# Patient Record
Sex: FEMALE | URBAN - METROPOLITAN AREA
[De-identification: names, ages, dates, MRNs, and addresses within clinical notes are randomized per-mention and may not be internally consistent; named-entity substitution may affect disease eponyms.]

---

## 2024-05-23 ENCOUNTER — OUTPATIENT (OUTPATIENT)
Dept: OUTPATIENT SERVICES | Facility: HOSPITAL | Age: 34
LOS: 1 days | End: 2024-05-23
Payer: COMMERCIAL

## 2024-05-23 VITALS — OXYGEN SATURATION: 96 % | HEART RATE: 83 BPM

## 2024-05-23 DIAGNOSIS — O26.899 OTHER SPECIFIED PREGNANCY RELATED CONDITIONS, UNSPECIFIED TRIMESTER: ICD-10-CM

## 2024-05-23 PROCEDURE — 59025 FETAL NON-STRESS TEST: CPT | Mod: 26

## 2024-05-23 PROCEDURE — 99221 1ST HOSP IP/OBS SF/LOW 40: CPT | Mod: 25

## 2024-05-23 PROCEDURE — G0463: CPT

## 2024-05-23 PROCEDURE — 59025 FETAL NON-STRESS TEST: CPT

## 2024-05-23 NOTE — OB PROVIDER TRIAGE NOTE - NSHPPHYSICALEXAM_GEN_ALL_CORE
Vital Signs Last 24 Hrs  T(C): 36.8 (23 May 2024 20:14), Max: 36.8 (23 May 2024 20:14)  T(F): 98.24 (23 May 2024 20:14), Max: 98.24 (23 May 2024 20:14)  HR: 85 (23 May 2024 20:14) (57 - 85)  BP: 116/73 (23 May 2024 20:14) (116/73 - 124/71)  RR: 18 (23 May 2024 20:14) (16 - 18)  SpO2: 94% (23 May 2024 20:09) (93% - 98%)    Gen: alert, NAD  Abd: gravid, soft, non-tender  Ext: wwp, no LE edema or pain bilaterally    SVE: 1/50/-3    EFM: baseline 130, mod variability, +accels, no decels  [place]: occ contractions   BSUS: vertex, BPP 8/8, fetal movement seen by pt, BECKY 30cm   EFW: 3500

## 2024-05-23 NOTE — OB PROVIDER TRIAGE NOTE - NSPRIMARYCAREPROV_OBGYN_ALL_OB
Please tell her at this point I would not take any medication just adopt a diet but we talked about. MD//VIET/JAYLA

## 2024-05-23 NOTE — OB PROVIDER TRIAGE NOTE - NSOBPROVIDERNOTE_OBGYN_ALL_OB_FT
32yo  @ 39w1d JUAN  presenting to triage c/o decreased fetal movement, now feeling normal movement. Fetal status reassuring with reactive tracing and 8 BPP. Noted to have polyhydramnios with BECKY of 30cm. Long discussion held with patient and partner regarding diagnosis and recommendation for IOL given current gestational age. Pt and partner in agreement with plan. Pt will go eat and return for IOL. Pt will return in 2-3 hours.    - pt to return in 2-3h for IOL  - plan for PO cytotec, GBS negative, maternal vitals stable   - labor precautions discussed with pt       Discussed with Dr. Amos

## 2024-05-23 NOTE — OB RN TRIAGE NOTE - MENTAL HEALTH CONDITIONS/SYMPTOMS, PROFILE
Pt with sharp abdominal pain starting this morning. Pain is intermittent. Some nausea present. No fever, vomiting, or diarrhea noted.
anxiety disorder

## 2024-05-23 NOTE — OB RN TRIAGE NOTE - FALL HARM RISK - UNIVERSAL INTERVENTIONS
Bed in lowest position, wheels locked, appropriate side rails in place/Call bell, personal items and telephone in reach/Instruct patient to call for assistance before getting out of bed or chair/Non-slip footwear when patient is out of bed/[place] to call system/Physically safe environment - no spills, clutter or unnecessary equipment/Purposeful Proactive Rounding/Room/bathroom lighting operational, light cord in reach

## 2024-05-23 NOTE — OB PROVIDER TRIAGE NOTE - HISTORY OF PRESENT ILLNESS
OB Triage Note    34yo  @ 39w1d, JUAN  presents to triage c/o decreased fetal movement for a few hours today with no fetal movement felt despite changing position and drinking cold water. Since presenting to triage she is feeling normal fetal movement. Denies contractions, LOF or VB.      PNC: Premier  AP Issues: none  PNL: GBS negative     OBHx:   GynHx: h/o ovarian cysts, denies abnormal Paps, STIs, fibroids   PMH: denies  PSH: denies  Meds: PNV  Allergies: NKDA  Social: denies alcohol/tobacco/drug use in pregnancy   Psych: denies anxiety/depression     Will accept blood transfusions: Yes OB Triage Note    32yo  @ 39w1d, JUAN  presents to triage c/o decreased fetal movement for a few hours today with no fetal movement felt despite changing position and drinking cold water. Since presenting to triage she is feeling normal fetal movement. Denies contractions, LOF or VB.      PNC: Premier  AP Issues: none  PNL: GBS negative     OBHx:   chemical pregnancy   GynHx: h/o ovarian cysts. Denies abnormal Paps, STIs, fibroids   PMH: denies  PSH: denies  Meds: PNV  Allergies: NKDA  Social: denies alcohol/tobacco/drug use in pregnancy   Psych: h/o anxiety, no meds    Will accept blood transfusions: Yes

## 2024-05-23 NOTE — OB RN TRIAGE NOTE - CHIEF COMPLAINT QUOTE
Sore throat and stomach ache started last night. Fever at home. Brother had strep throat last week. "I wasn't feeling baby move today, but I am feeling movement now"

## 2024-05-24 ENCOUNTER — INPATIENT (INPATIENT)
Facility: HOSPITAL | Age: 34
LOS: 2 days | Discharge: HOME | DRG: 951 | End: 2024-05-27
Attending: OBSTETRICS & GYNECOLOGY | Admitting: OBSTETRICS & GYNECOLOGY
Payer: COMMERCIAL

## 2024-05-24 VITALS — DIASTOLIC BLOOD PRESSURE: 74 MMHG | RESPIRATION RATE: 18 BRPM | SYSTOLIC BLOOD PRESSURE: 120 MMHG | TEMPERATURE: 98 F

## 2024-05-24 VITALS — HEART RATE: 63 BPM | OXYGEN SATURATION: 98 %

## 2024-05-24 DIAGNOSIS — Z3A.39 39 WEEKS GESTATION OF PREGNANCY: ICD-10-CM

## 2024-05-24 DIAGNOSIS — O26.899 OTHER SPECIFIED PREGNANCY RELATED CONDITIONS, UNSPECIFIED TRIMESTER: ICD-10-CM

## 2024-05-24 DIAGNOSIS — O36.8130 DECREASED FETAL MOVEMENTS, THIRD TRIMESTER, NOT APPLICABLE OR UNSPECIFIED: ICD-10-CM

## 2024-05-24 DIAGNOSIS — Z34.80 ENCOUNTER FOR SUPERVISION OF OTHER NORMAL PREGNANCY, UNSPECIFIED TRIMESTER: ICD-10-CM

## 2024-05-24 LAB
BASOPHILS # BLD AUTO: 0.02 K/UL — SIGNIFICANT CHANGE UP (ref 0–0.2)
BASOPHILS NFR BLD AUTO: 0.2 % — SIGNIFICANT CHANGE UP (ref 0–2)
BLD GP AB SCN SERPL QL: NEGATIVE — SIGNIFICANT CHANGE UP
EOSINOPHIL # BLD AUTO: 0.09 K/UL — SIGNIFICANT CHANGE UP (ref 0–0.5)
EOSINOPHIL NFR BLD AUTO: 1 % — SIGNIFICANT CHANGE UP (ref 0–6)
HCT VFR BLD CALC: 33.8 % — LOW (ref 34.5–45)
HGB BLD-MCNC: 11.4 G/DL — LOW (ref 11.5–15.5)
IMM GRANULOCYTES NFR BLD AUTO: 0.5 % — SIGNIFICANT CHANGE UP (ref 0–0.9)
LYMPHOCYTES # BLD AUTO: 1.64 K/UL — SIGNIFICANT CHANGE UP (ref 1–3.3)
LYMPHOCYTES # BLD AUTO: 18.5 % — SIGNIFICANT CHANGE UP (ref 13–44)
MCHC RBC-ENTMCNC: 29.9 PG — SIGNIFICANT CHANGE UP (ref 27–34)
MCHC RBC-ENTMCNC: 33.7 GM/DL — SIGNIFICANT CHANGE UP (ref 32–36)
MCV RBC AUTO: 88.7 FL — SIGNIFICANT CHANGE UP (ref 80–100)
MONOCYTES # BLD AUTO: 1.22 K/UL — HIGH (ref 0–0.9)
MONOCYTES NFR BLD AUTO: 13.8 % — SIGNIFICANT CHANGE UP (ref 2–14)
NEUTROPHILS # BLD AUTO: 5.85 K/UL — SIGNIFICANT CHANGE UP (ref 1.8–7.4)
NEUTROPHILS NFR BLD AUTO: 66 % — SIGNIFICANT CHANGE UP (ref 43–77)
NRBC # BLD: 0 /100 WBCS — SIGNIFICANT CHANGE UP (ref 0–0)
PLATELET # BLD AUTO: 202 K/UL — SIGNIFICANT CHANGE UP (ref 150–400)
RBC # BLD: 3.81 M/UL — SIGNIFICANT CHANGE UP (ref 3.8–5.2)
RBC # FLD: 14 % — SIGNIFICANT CHANGE UP (ref 10.3–14.5)
RH IG SCN BLD-IMP: POSITIVE — SIGNIFICANT CHANGE UP
T PALLIDUM AB TITR SER: NEGATIVE — SIGNIFICANT CHANGE UP
WBC # BLD: 8.86 K/UL — SIGNIFICANT CHANGE UP (ref 3.8–10.5)
WBC # FLD AUTO: 8.86 K/UL — SIGNIFICANT CHANGE UP (ref 3.8–10.5)

## 2024-05-24 RX ORDER — CITRIC ACID/SODIUM CITRATE 300-500 MG
15 SOLUTION, ORAL ORAL EVERY 6 HOURS
Refills: 0 | Status: DISCONTINUED | OUTPATIENT
Start: 2024-05-24 | End: 2024-05-25

## 2024-05-24 RX ORDER — OXYTOCIN 10 UNIT/ML
333.33 VIAL (ML) INJECTION
Qty: 20 | Refills: 0 | Status: DISCONTINUED | OUTPATIENT
Start: 2024-05-24 | End: 2024-05-27

## 2024-05-24 RX ORDER — SODIUM CHLORIDE 9 MG/ML
1000 INJECTION, SOLUTION INTRAVENOUS
Refills: 0 | Status: DISCONTINUED | OUTPATIENT
Start: 2024-05-24 | End: 2024-05-25

## 2024-05-24 RX ORDER — CHLORHEXIDINE GLUCONATE 213 G/1000ML
1 SOLUTION TOPICAL DAILY
Refills: 0 | Status: DISCONTINUED | OUTPATIENT
Start: 2024-05-24 | End: 2024-05-25

## 2024-05-24 RX ADMIN — SODIUM CHLORIDE 125 MILLILITER(S): 9 INJECTION, SOLUTION INTRAVENOUS at 19:08

## 2024-05-24 NOTE — OB PROVIDER H&P - NSHPPHYSICALEXAM_GEN_ALL_CORE
Vital Signs Last 24 Hrs  T(C): 36.8 (23 May 2024 20:14), Max: 36.8 (23 May 2024 20:14)  T(F): 98.24 (23 May 2024 20:14), Max: 98.24 (23 May 2024 20:14)  HR: 75 (24 May 2024 01:25) (57 - 85)  BP: 120/74 (24 May 2024 01:14) (116/73 - 124/71)  RR: 18 (23 May 2024 20:14) (16 - 18)  SpO2: 97% (24 May 2024 01:25) (93% - 99%)    Gen: alert, NAD  Abd: gravid, soft, non-tender  Ext: wwp, no LE edema or pain bilaterally    SVE: 1/50/-3 from triage visit    EFM: baseline *, mod variability, +accels, no decels  [place]: ***  BSUS:   EFW: Vital Signs Last 24 Hrs  T(C): 36.8 (23 May 2024 20:14), Max: 36.8 (23 May 2024 20:14)  T(F): 98.24 (23 May 2024 20:14), Max: 98.24 (23 May 2024 20:14)  HR: 75 (24 May 2024 01:25) (57 - 85)  BP: 120/74 (24 May 2024 01:14) (116/73 - 124/71)  RR: 18 (23 May 2024 20:14) (16 - 18)  SpO2: 97% (24 May 2024 01:25) (93% - 99%)    Gen: alert, NAD  Abd: gravid, soft, non-tender  Ext: wwp, no LE edema or pain bilaterally    SVE: 1/50/-3 from triage visit    EFM: baseline 125, mod variability, +accels, no decels  [place]: occ contractions   BSUS: vertex

## 2024-05-24 NOTE — OB PROVIDER H&P - HISTORY OF PRESENT ILLNESS
OB Admission H&P    yo G*P* @  *w*d LMP ***, JUAN *** presents for ***.      PNC: Premier  AP Issues: Polyhydramnios as per above  PNL: GBS negative     OBHx:   GynHx: denies abnormal Paps, STIs, cysts, fibroids   PMH: denies  PSH: denies  Meds: PNV  Allergies:  Social: denies alcohol/tobacco/drug use in pregnancy   Psych: denies anxiety/depression     Will accept blood transfusions: Yes OB Admission H&P    32yo  @ 39w2d, JUAN  presents for IOL for newly diagnosed polyhydramnios. Pt was seen earlier in triage for decreased FM, reassuring fetal status but with polyhydramnios, BECKY 30cm. Denies contractions, VB, LOF. +FM.       PNC: Premier  AP Issues: Polyhydramnios as per above  PNL: GBS negative     OBHx:   chemical pregnancy   GynHx: h/o ovarian cysts. Denies abnormal Paps, STIs, fibroids   PMH: denies  PSH: denies  Meds: PNV  Allergies: NKDA  Social: denies alcohol/tobacco/drug use in pregnancy   Psych: h/o anxiety, no meds    Will accept blood transfusions: Yes

## 2024-05-24 NOTE — OB PROVIDER H&P - ASSESSMENT
yo G*P* @  *w*d JUAN presting with ***, found to be ***. Fetal status reassuring with Cat I tracing. GBS ***, SVE ***. Maternal vitals stable. Will admit for ***    - Admit to L&D  - Admission labs: CBC, RPR, T&S, Covid Ab/PCR  -Clears  -cEFM/[place]  -***for GBS prophylaxis   -***induction/augmentation/expectant management   - Elevated PPH risk 2/2 ***  - Anesthesia consult prn     Discussed with  *** 32yo  @ 39w2d JUAN  here for IOL for newly diagnosed polyhydramnios, BECKY 30cm. VE /-3, GBS negative, fetal status reassuring with Cat I tracing.  Maternal vitals stable.     - Admit to L&D  - Admission labs: CBC, RPR, T&S  - Clears, mIVF  - cEFM/[place]  - for oral cytotec induction  - Elevated PPH risk 2/2 polyhydramnios   - Anesthesia consult prn for epidural placement     Discussed with Dr. Amos 32yo  @ 39w2d JUAN  here for IOL for newly diagnosed polyhydramnios, BECKY 30cm. VE /-3, GBS negative, fetal status reassuring with Cat I tracing.  Maternal vitals stable.     - Admit to L&D  - Admission labs: CBC, RPR, T&S  - Clears, mIVF  - cEFM/[place]  - for oral cytotec induction  - Elevated PPH risk 2/2 polyhydramnios   - Anesthesia consult prn for epidural placement     Discussed with Dr. Amos    pt seen and eval by me   induction started for dec  newly diagnosed polyhydramnios   pt getting cytotec   fhrt reassureing   cont current management

## 2024-05-24 NOTE — OB RN PATIENT PROFILE - FALL HARM RISK - UNIVERSAL INTERVENTIONS
Bed in lowest position, wheels locked, appropriate side rails in place/Call bell, personal items and telephone in reach/Instruct patient to call for assistance before getting out of bed or chair/Non-slip footwear when patient is out of bed/[place] to call system/Physically safe environment - no spills, clutter or unnecessary equipment/Purposeful Proactive Rounding/Room/bathroom lighting operational, light cord in reach

## 2024-05-25 RX ORDER — HYDROCORTISONE 1 %
1 OINTMENT (GRAM) TOPICAL EVERY 6 HOURS
Refills: 0 | Status: DISCONTINUED | OUTPATIENT
Start: 2024-05-25 | End: 2024-05-27

## 2024-05-25 RX ORDER — IBUPROFEN 200 MG
600 TABLET ORAL EVERY 6 HOURS
Refills: 0 | Status: DISCONTINUED | OUTPATIENT
Start: 2024-05-25 | End: 2024-05-27

## 2024-05-25 RX ORDER — MAGNESIUM HYDROXIDE 400 MG/1
30 TABLET, CHEWABLE ORAL
Refills: 0 | Status: DISCONTINUED | OUTPATIENT
Start: 2024-05-25 | End: 2024-05-27

## 2024-05-25 RX ORDER — OXYTOCIN 10 UNIT/ML
4 VIAL (ML) INJECTION
Qty: 30 | Refills: 0 | Status: DISCONTINUED | OUTPATIENT
Start: 2024-05-25 | End: 2024-05-25

## 2024-05-25 RX ORDER — ACETAMINOPHEN 500 MG
975 TABLET ORAL
Refills: 0 | Status: DISCONTINUED | OUTPATIENT
Start: 2024-05-25 | End: 2024-05-27

## 2024-05-25 RX ORDER — LANOLIN
1 OINTMENT (GRAM) TOPICAL EVERY 6 HOURS
Refills: 0 | Status: DISCONTINUED | OUTPATIENT
Start: 2024-05-25 | End: 2024-05-27

## 2024-05-25 RX ORDER — OXYCODONE HYDROCHLORIDE 5 MG/1
5 TABLET ORAL ONCE
Refills: 0 | Status: DISCONTINUED | OUTPATIENT
Start: 2024-05-25 | End: 2024-05-27

## 2024-05-25 RX ORDER — AER TRAVELER 0.5 G/1
1 SOLUTION RECTAL; TOPICAL EVERY 4 HOURS
Refills: 0 | Status: DISCONTINUED | OUTPATIENT
Start: 2024-05-25 | End: 2024-05-27

## 2024-05-25 RX ORDER — OXYCODONE HYDROCHLORIDE 5 MG/1
5 TABLET ORAL
Refills: 0 | Status: DISCONTINUED | OUTPATIENT
Start: 2024-05-25 | End: 2024-05-27

## 2024-05-25 RX ORDER — IBUPROFEN 200 MG
600 TABLET ORAL EVERY 6 HOURS
Refills: 0 | Status: COMPLETED | OUTPATIENT
Start: 2024-05-25 | End: 2025-04-23

## 2024-05-25 RX ORDER — SODIUM CHLORIDE 9 MG/ML
3 INJECTION INTRAMUSCULAR; INTRAVENOUS; SUBCUTANEOUS EVERY 8 HOURS
Refills: 0 | Status: DISCONTINUED | OUTPATIENT
Start: 2024-05-25 | End: 2024-05-27

## 2024-05-25 RX ORDER — PRAMOXINE HYDROCHLORIDE 150 MG/15G
1 AEROSOL, FOAM RECTAL EVERY 4 HOURS
Refills: 0 | Status: DISCONTINUED | OUTPATIENT
Start: 2024-05-25 | End: 2024-05-27

## 2024-05-25 RX ORDER — OXYTOCIN 10 UNIT/ML
41.67 VIAL (ML) INJECTION
Qty: 20 | Refills: 0 | Status: DISCONTINUED | OUTPATIENT
Start: 2024-05-25 | End: 2024-05-27

## 2024-05-25 RX ORDER — DIPHENHYDRAMINE HCL 50 MG
25 CAPSULE ORAL EVERY 6 HOURS
Refills: 0 | Status: DISCONTINUED | OUTPATIENT
Start: 2024-05-25 | End: 2024-05-27

## 2024-05-25 RX ORDER — SIMETHICONE 80 MG/1
80 TABLET, CHEWABLE ORAL EVERY 4 HOURS
Refills: 0 | Status: DISCONTINUED | OUTPATIENT
Start: 2024-05-25 | End: 2024-05-27

## 2024-05-25 RX ORDER — DIBUCAINE 1 %
1 OINTMENT (GRAM) RECTAL EVERY 6 HOURS
Refills: 0 | Status: DISCONTINUED | OUTPATIENT
Start: 2024-05-25 | End: 2024-05-27

## 2024-05-25 RX ORDER — KETOROLAC TROMETHAMINE 30 MG/ML
30 SYRINGE (ML) INJECTION ONCE
Refills: 0 | Status: DISCONTINUED | OUTPATIENT
Start: 2024-05-25 | End: 2024-05-25

## 2024-05-25 RX ORDER — TETANUS TOXOID, REDUCED DIPHTHERIA TOXOID AND ACELLULAR PERTUSSIS VACCINE, ADSORBED 5; 2.5; 8; 8; 2.5 [IU]/.5ML; [IU]/.5ML; UG/.5ML; UG/.5ML; UG/.5ML
0.5 SUSPENSION INTRAMUSCULAR ONCE
Refills: 0 | Status: DISCONTINUED | OUTPATIENT
Start: 2024-05-25 | End: 2024-05-27

## 2024-05-25 RX ORDER — SODIUM CHLORIDE 9 MG/ML
1000 INJECTION, SOLUTION INTRAVENOUS ONCE
Refills: 0 | Status: COMPLETED | OUTPATIENT
Start: 2024-05-25 | End: 2024-05-25

## 2024-05-25 RX ORDER — BENZOCAINE 10 %
1 GEL (GRAM) MUCOUS MEMBRANE EVERY 6 HOURS
Refills: 0 | Status: DISCONTINUED | OUTPATIENT
Start: 2024-05-25 | End: 2024-05-27

## 2024-05-25 RX ADMIN — Medication 4 MILLIUNIT(S)/MIN: at 09:40

## 2024-05-25 RX ADMIN — SODIUM CHLORIDE 125 MILLILITER(S): 9 INJECTION, SOLUTION INTRAVENOUS at 11:19

## 2024-05-25 RX ADMIN — SODIUM CHLORIDE 1000 MILLILITER(S): 9 INJECTION, SOLUTION INTRAVENOUS at 11:55

## 2024-05-25 RX ADMIN — Medication 30 MILLIGRAM(S): at 19:07

## 2024-05-25 RX ADMIN — SODIUM CHLORIDE 125 MILLILITER(S): 9 INJECTION, SOLUTION INTRAVENOUS at 02:53

## 2024-05-25 RX ADMIN — SODIUM CHLORIDE 125 MILLILITER(S): 9 INJECTION, SOLUTION INTRAVENOUS at 16:00

## 2024-05-25 NOTE — OB PROVIDER DELIVERY SUMMARY - NSPROVIDERDELIVERYNOTE_OBGYN_ALL_OB_FT
Pt fully dilated and pushed    of viable female over midline episiotomy without extension   nuchal cord x 1 clamped and cut on perineum   no vaginal, cervical lacerations   placenta delivered intact   episiotomy repaired with 2-0 chromic with good hemostasis and restoration of anatomy   Pt and infant helen procedure well

## 2024-05-25 NOTE — PRE-ANESTHESIA EVALUATION ADULT - NSANTHOSAYNRD_GEN_A_CORE
No. MALU screening performed.  STOP BANG Legend: 0-2 = LOW Risk; 3-4 = INTERMEDIATE Risk; 5-8 = HIGH Risk
No. MALU screening performed.  STOP BANG Legend: 0-2 = LOW Risk; 3-4 = INTERMEDIATE Risk; 5-8 = HIGH Risk

## 2024-05-25 NOTE — OB RN DELIVERY SUMMARY - NSSELHIDDEN_OBGYN_ALL_OB_FT
[NS_DeliveryAttending1_OBGYN_ALL_OB_FT:[id number]],[NS_DeliveryRN_OBGYN_ALL_OB_FT:[id number]=]

## 2024-05-25 NOTE — PRE-ANESTHESIA EVALUATION ADULT - NSANTHTOBACCOSD_GEN_ALL_CORE
SW/CM Discharge Plan  Informed patient is ready for discharge. Patient’s disposition is Home or Self Care. Patient to be transported by wife.  Patient/interested person has been counseled for post hospitalization care.  Patient agrees and understands goals and plan. Initial implementation of the patient’s discharge plan has been arranged, including any devices/equipment needed for discharge. Discharge plan communicated to MD and RN.      
No
No

## 2024-05-25 NOTE — OB PROVIDER DELIVERY SUMMARY - NSLOWPPHRISK_OBGYN_A_OB
No previous uterine incision/[place] Pregnancy/Less than or equal to 4 previous vaginal births/No known bleeding disorder/No history of postpartum hemorrhage

## 2024-05-25 NOTE — OB PROVIDER LABOR PROGRESS NOTE - NS_OBIHIFHRDETAILS_OBGYN_ALL_OB_FT
baseline 130, mod variability, +accels, no decels
category 1, baseline 140, moderate variability, + accels, - decels

## 2024-05-25 NOTE — OB RN DELIVERY SUMMARY - NS_SEPSISRSKCALC_OBGYN_ALL_OB_FT
EOS calculated successfully. EOS Risk Factor: 0.5/1000 live births ([facility] national incidence); GA=39w3d; Temp=99.7; ROM=6.85; GBS='Negative'; Antibiotics='No antibiotics or any antibiotics < 2 hrs prior to birth'

## 2024-05-25 NOTE — OB PROVIDER LABOR PROGRESS NOTE - ASSESSMENT
- Patient with good cervical change, descent of fetal head  - Fetal head ROT and asynclitic  - Patient placed on left lateral with peanut ball to encourage rotation of fetal head  - Patient with epidural in place, comfortable    d/w Dr. Oppenheim, who is en route  AME Montero, PGY-1
IOL for poly (BECKY 30)    -Labor: currently on buccal Cytotec; will transition to pitocin if pt continues to have ctx too frequently for cytotec  -Fetus: cat 2 with intermittent variable decels, mod variability, overall reassuring  -GBS: neg  -Pain: pt without intervention for pain at this time    D/w Dr. Oppenheim VTimmel PGY2
fhrt with one decel with ctx and recovery to baseline, no other decels   overall reassuring  AROM clear   3-4/60/-2   irreg ctx   cont current management   M.Oppenheim,MD   
33y G1 at 39w2 IOL for Poly (BECKY 30).  Patient seen at bedside for 4.5 minute deceleration which improved with repositioning. Patient examined in the setting of her decelerations    - Continue EFM/ TOCO  - Continue PO cytotec       Plan per Dr. Oppenheimer Isabella Hermantin, PGY1 
P0 @ 39w2d admitted for IOL for poly, has been receiving PO cytotec, VE 3-4/50/-2, Cat I tracing. Maternal vitals stable.   - cont present management  - will start buccal cytotec   - cEFM/toco

## 2024-05-25 NOTE — OB PROVIDER LABOR PROGRESS NOTE - NS_SUBJECTIVE/OBJECTIVE_OBGYN_ALL_OB_FT
Patient seen at bedside for prolonged deceleration 4-5 minutes
Pt seen and examined at bedside.    Vital Signs Last 24 Hrs  T(C): 36.7 (25 May 2024 05:50), Max: 37.2 (24 May 2024 17:35)  T(F): 98.06 (25 May 2024 05:50), Max: 98.96 (24 May 2024 17:35)  HR: 59 (25 May 2024 07:29) (44 - 98)  BP: 112/62 (25 May 2024 07:11) (101/54 - 118/63)  BP(mean): --  RR: 16 (24 May 2024 17:35) (16 - 16)  SpO2: 97% (25 May 2024 07:29) (88% - 100%)
Patient seen and examined due to increased pressure.
Pt with rectal pressure. Has been receiving PO cytotec.    Vital Signs Last 24 Hrs  T(C): 36.7 (24 May 2024 19:10), Max: 37.2 (24 May 2024 17:35)  T(F): 98.06 (24 May 2024 19:10), Max: 98.96 (24 May 2024 17:35)  HR: 59 (24 May 2024 22:40) (48 - 98)  BP: 109/63 (24 May 2024 19:10) (99/58 - 120/74)  RR: 16 (24 May 2024 17:35) (16 - 18)  SpO2: 98% (24 May 2024 22:40) (88% - 100%)

## 2024-05-26 RX ORDER — FAMOTIDINE 10 MG/ML
20 INJECTION INTRAVENOUS ONCE
Refills: 0 | Status: COMPLETED | OUTPATIENT
Start: 2024-05-26 | End: 2024-05-26

## 2024-05-26 RX ORDER — FAMOTIDINE 10 MG/ML
20 INJECTION INTRAVENOUS DAILY
Refills: 0 | Status: DISCONTINUED | OUTPATIENT
Start: 2024-05-26 | End: 2024-05-27

## 2024-05-26 RX ADMIN — Medication 975 MILLIGRAM(S): at 02:36

## 2024-05-26 RX ADMIN — Medication 975 MILLIGRAM(S): at 21:24

## 2024-05-26 RX ADMIN — Medication 600 MILLIGRAM(S): at 12:20

## 2024-05-26 RX ADMIN — Medication 975 MILLIGRAM(S): at 14:16

## 2024-05-26 RX ADMIN — Medication 1 TABLET(S): at 11:22

## 2024-05-26 RX ADMIN — Medication 975 MILLIGRAM(S): at 15:15

## 2024-05-26 RX ADMIN — FAMOTIDINE 20 MILLIGRAM(S): 10 INJECTION INTRAVENOUS at 01:04

## 2024-05-26 RX ADMIN — Medication 975 MILLIGRAM(S): at 09:35

## 2024-05-26 RX ADMIN — Medication 600 MILLIGRAM(S): at 00:28

## 2024-05-26 RX ADMIN — Medication 600 MILLIGRAM(S): at 17:33

## 2024-05-26 RX ADMIN — Medication 975 MILLIGRAM(S): at 22:00

## 2024-05-26 RX ADMIN — Medication 600 MILLIGRAM(S): at 06:06

## 2024-05-26 RX ADMIN — Medication 975 MILLIGRAM(S): at 08:35

## 2024-05-26 RX ADMIN — Medication 600 MILLIGRAM(S): at 18:30

## 2024-05-26 RX ADMIN — FAMOTIDINE 20 MILLIGRAM(S): 10 INJECTION INTRAVENOUS at 08:35

## 2024-05-26 RX ADMIN — Medication 600 MILLIGRAM(S): at 11:23

## 2024-05-26 NOTE — PROGRESS NOTE ADULT - ASSESSMENT
Patient is PPD#1 from [facility] meeting all postpartum milestones. Vital signs are stable and physical exam is unremarkable. Epigastric discomfort improved with Pepcid, continue daily.   - Continue with po analgesia  - Increase ambulation  - Continue regular diet  - HR as low as high 40s/early 50s; upon chart review noted to be same baseline during labor course. Patient is asymptomatic.    AME Montero, PGY-1 Patient is PPD#1 from  meeting all postpartum milestones. Vital signs are stable and physical exam is unremarkable. Epigastric discomfort improved with Pepcid, continue daily.   - Continue with po analgesia  - Increase ambulation  - Continue regular diet  - HR as low as high 40s/early 50s; upon chart review noted to be same baseline during labor course. Patient is asymptomatic.    AME Montero, PGY-1    pt seen and eval by me   doing well   cont current care   M. Oppenheim, MD  Patient is PPD#1 from [facility] meeting all postpartum milestones. Vital signs are stable and physical exam is unremarkable. Epigastric discomfort improved with Pepcid, continue daily.   - Continue with po analgesia  - Increase ambulation  - Continue regular diet  - HR as low as high 40s/early 50s; upon chart review noted to be same baseline during labor course. Patient is asymptomatic.    AME Montero, PGY-1      Pt co pain with deep breath - no CP, no sob, no cough, no palpitations   lungs cta bl   likely musc-skel pain but will get cxr to eval   if clear then will dc home  M. Oppenheim, MD

## 2024-05-26 NOTE — PROGRESS NOTE ADULT - SUBJECTIVE AND OBJECTIVE BOX
Patient seen and examined at bedside. Overnight, patient given Pepcid due to epigastric discomfort. Patient reports discomfort has improved with the Pepcid. No acute complaints, pain well controlled. Patient is ambulating, voiding spontaneously, passing gas, and tolerating regular diet. Denies CP, SOB, N/V, HA, blurred vision, epigastric pain.    Vital Signs Last 24 Hours  T(C): 36.4 (05-26-24 @ 05:57), Max: 37.6 (05-25-24 @ 18:10)  HR: 50 (05-26-24 @ 05:57) (44 - 105)  BP: 118/75 (05-26-24 @ 05:57) (91/63 - 144/62)  RR: 18 (05-26-24 @ 05:57) (16 - 18)  SpO2: 98% (05-26-24 @ 05:57) (79% - 100%)    Physical Exam:  General: NAD  Abdomen: Soft, non-tender, non-distended, fundus firm  Pelvic: Lochia wnl    Labs:    Blood Type: A Positive  Antibody Screen: Negative  RPR: Negative               11.4   8.86  )-----------( 202      ( 05-24 @ 02:45 )             33.8         MEDICATIONS  (STANDING):  acetaminophen     Tablet .. 975 milliGRAM(s) Oral <User Schedule>  diphtheria/tetanus/pertussis (acellular) Vaccine (Adacel) 0.5 milliLiter(s) IntraMuscular once  ibuprofen  Tablet. 600 milliGRAM(s) Oral every 6 hours  oxytocin Infusion 41.667 milliUNIT(s)/Min (125 mL/Hr) IV Continuous <Continuous>  oxytocin Infusion 333.333 milliUNIT(s)/Min (1000 mL/Hr) IV Continuous <Continuous>  prenatal multivitamin 1 Tablet(s) Oral daily  sodium chloride 0.9% lock flush 3 milliLiter(s) IV Push every 8 hours    MEDICATIONS  (PRN):  benzocaine 20%/menthol 0.5% Spray 1 Spray(s) Topical every 6 hours PRN for Perineal discomfort  dibucaine 1% Ointment 1 Application(s) Topical every 6 hours PRN Perineal discomfort  diphenhydrAMINE 25 milliGRAM(s) Oral every 6 hours PRN Pruritus  hydrocortisone 1% Cream 1 Application(s) Topical every 6 hours PRN Moderate Pain (4-6)  lanolin Ointment 1 Application(s) Topical every 6 hours PRN nipple soreness  magnesium hydroxide Suspension 30 milliLiter(s) Oral two times a day PRN Constipation  oxyCODONE    IR 5 milliGRAM(s) Oral every 3 hours PRN Moderate to Severe Pain (4-10)  oxyCODONE    IR 5 milliGRAM(s) Oral once PRN Moderate to Severe Pain (4-10)  pramoxine 1%/zinc 5% Cream 1 Application(s) Topical every 4 hours PRN Moderate Pain (4-6)  simethicone 80 milliGRAM(s) Chew every 4 hours PRN Gas  witch hazel Pads 1 Application(s) Topical every 4 hours PRN Perineal discomfort

## 2024-05-27 VITALS
SYSTOLIC BLOOD PRESSURE: 118 MMHG | DIASTOLIC BLOOD PRESSURE: 81 MMHG | HEART RATE: 49 BPM | OXYGEN SATURATION: 97 % | TEMPERATURE: 98 F | RESPIRATION RATE: 18 BRPM

## 2024-05-27 PROCEDURE — 86850 RBC ANTIBODY SCREEN: CPT

## 2024-05-27 PROCEDURE — 86900 BLOOD TYPING SEROLOGIC ABO: CPT

## 2024-05-27 PROCEDURE — 71046 X-RAY EXAM CHEST 2 VIEWS: CPT

## 2024-05-27 PROCEDURE — 59050 FETAL MONITOR W/REPORT: CPT

## 2024-05-27 PROCEDURE — 86901 BLOOD TYPING SEROLOGIC RH(D): CPT

## 2024-05-27 PROCEDURE — 86780 TREPONEMA PALLIDUM: CPT

## 2024-05-27 PROCEDURE — 85025 COMPLETE CBC W/AUTO DIFF WBC: CPT

## 2024-05-27 PROCEDURE — 71046 X-RAY EXAM CHEST 2 VIEWS: CPT | Mod: 26

## 2024-05-27 RX ORDER — IBUPROFEN 200 MG
1 TABLET ORAL
Qty: 0 | Refills: 0 | DISCHARGE
Start: 2024-05-27

## 2024-05-27 RX ORDER — ACETAMINOPHEN 500 MG
3 TABLET ORAL
Qty: 0 | Refills: 0 | DISCHARGE
Start: 2024-05-27

## 2024-05-27 RX ADMIN — Medication 1 TABLET(S): at 11:56

## 2024-05-27 RX ADMIN — Medication 600 MILLIGRAM(S): at 01:10

## 2024-05-27 RX ADMIN — Medication 600 MILLIGRAM(S): at 00:39

## 2024-05-27 RX ADMIN — Medication 600 MILLIGRAM(S): at 05:03

## 2024-05-27 RX ADMIN — Medication 600 MILLIGRAM(S): at 12:32

## 2024-05-27 RX ADMIN — Medication 975 MILLIGRAM(S): at 08:53

## 2024-05-27 RX ADMIN — Medication 600 MILLIGRAM(S): at 11:56

## 2024-05-27 RX ADMIN — FAMOTIDINE 20 MILLIGRAM(S): 10 INJECTION INTRAVENOUS at 08:52

## 2024-05-27 RX ADMIN — Medication 975 MILLIGRAM(S): at 15:28

## 2024-05-27 RX ADMIN — Medication 975 MILLIGRAM(S): at 16:00

## 2024-05-27 RX ADMIN — Medication 975 MILLIGRAM(S): at 09:30

## 2024-05-27 NOTE — DISCHARGE NOTE OB - HOSPITAL COURSE
see hospital record for detailed account   pt ambulating, helen regular diet and ready for dc home

## 2024-05-27 NOTE — DISCHARGE NOTE OB - PATIENT PORTAL LINK FT
You can access the FollowMyHealth Patient Portal offered by [facility] by registering at the following website: [email or web address]. By joining [employer]’s FollowMyHealth portal, you will also be able to view your health information using other applications (apps) compatible with our system.

## 2024-05-27 NOTE — DISCHARGE NOTE OB - MATERIALS PROVIDED
[facility] [place] Screening Program/Breastfeeding Log/Breastfeeding Mother’s Support Group Information/Guide to Postpartum Care/[facility] Hearing Screen Program/Back To Sleep Handout/Shaken Baby Prevention Handout/Breastfeeding Guide and Packet/Birth Certificate Instructions/Discharge Medication Information for Patients and Families Pocket Guide

## 2024-05-27 NOTE — DISCHARGE NOTE OB - CARE PROVIDER_API CALL
Oppenheim, Melissa H  Obstetrics and Gynecology  [address] [facility] [address], [address] [place] [id number]  Phone: [phone]  Fax: [phone]  Follow Up Time:

## 2024-11-20 PROBLEM — Z00.00 ENCOUNTER FOR PREVENTIVE HEALTH EXAMINATION: Status: ACTIVE | Noted: 2024-11-20

## 2024-11-21 ENCOUNTER — APPOINTMENT (OUTPATIENT)
Dept: ORTHOPEDIC SURGERY | Facility: CLINIC | Age: 34
End: 2024-11-21
Payer: COMMERCIAL

## 2024-11-21 VITALS — WEIGHT: 135 LBS | HEIGHT: 62 IN | BODY MASS INDEX: 24.84 KG/M2

## 2024-11-21 DIAGNOSIS — G56.80 OTHER SPECIFIED MONONEUROPATHIES OF UNSPECIFIED UPPER LIMB: ICD-10-CM

## 2024-11-21 DIAGNOSIS — E78.00 PURE HYPERCHOLESTEROLEMIA, UNSPECIFIED: ICD-10-CM

## 2024-11-21 DIAGNOSIS — M25.512 PAIN IN LEFT SHOULDER: ICD-10-CM

## 2024-11-21 DIAGNOSIS — Z78.9 OTHER SPECIFIED HEALTH STATUS: ICD-10-CM

## 2024-11-21 PROCEDURE — 99203 OFFICE O/P NEW LOW 30 MIN: CPT

## 2024-12-17 ENCOUNTER — APPOINTMENT (OUTPATIENT)
Dept: MRI IMAGING | Facility: CLINIC | Age: 34
End: 2024-12-17

## 2024-12-17 PROCEDURE — 73221 MRI JOINT UPR EXTREM W/O DYE: CPT | Mod: LT

## 2025-01-06 ENCOUNTER — APPOINTMENT (OUTPATIENT)
Dept: ORTHOPEDIC SURGERY | Facility: CLINIC | Age: 35
End: 2025-01-06

## 2025-01-13 ENCOUNTER — APPOINTMENT (OUTPATIENT)
Dept: NEUROLOGY | Facility: CLINIC | Age: 35
End: 2025-01-13

## 2025-01-16 ENCOUNTER — APPOINTMENT (OUTPATIENT)
Dept: NEUROLOGY | Facility: CLINIC | Age: 35
End: 2025-01-16
Payer: COMMERCIAL

## 2025-01-16 PROCEDURE — 95886 MUSC TEST DONE W/N TEST COMP: CPT

## 2025-01-16 PROCEDURE — 95910 NRV CNDJ TEST 7-8 STUDIES: CPT

## 2025-01-20 ENCOUNTER — APPOINTMENT (OUTPATIENT)
Dept: ORTHOPEDIC SURGERY | Facility: CLINIC | Age: 35
End: 2025-01-20

## 2025-02-13 ENCOUNTER — APPOINTMENT (OUTPATIENT)
Dept: ORTHOPEDIC SURGERY | Facility: CLINIC | Age: 35
End: 2025-02-13
Payer: COMMERCIAL

## 2025-02-13 DIAGNOSIS — G56.80 OTHER SPECIFIED MONONEUROPATHIES OF UNSPECIFIED UPPER LIMB: ICD-10-CM

## 2025-02-13 DIAGNOSIS — M25.512 PAIN IN LEFT SHOULDER: ICD-10-CM

## 2025-02-13 PROCEDURE — 99214 OFFICE O/P EST MOD 30 MIN: CPT

## 2025-03-10 ENCOUNTER — APPOINTMENT (OUTPATIENT)
Dept: ORTHOPEDIC SURGERY | Facility: CLINIC | Age: 35
End: 2025-03-10

## 2025-07-02 ENCOUNTER — APPOINTMENT (OUTPATIENT)
Dept: ORTHOPEDIC SURGERY | Facility: CLINIC | Age: 35
End: 2025-07-02

## 2025-07-02 PROCEDURE — 99203 OFFICE O/P NEW LOW 30 MIN: CPT | Mod: 25

## 2025-07-02 PROCEDURE — 20611 DRAIN/INJ JOINT/BURSA W/US: CPT | Mod: LT

## 2025-07-21 ENCOUNTER — APPOINTMENT (OUTPATIENT)
Dept: ORTHOPEDIC SURGERY | Facility: CLINIC | Age: 35
End: 2025-07-21
Payer: COMMERCIAL

## 2025-07-21 DIAGNOSIS — G56.80 OTHER SPECIFIED MONONEUROPATHIES OF UNSPECIFIED UPPER LIMB: ICD-10-CM

## 2025-07-21 DIAGNOSIS — M25.812 OTHER SPECIFIED JOINT DISORDERS, LEFT SHOULDER: ICD-10-CM

## 2025-07-21 DIAGNOSIS — M25.512 PAIN IN LEFT SHOULDER: ICD-10-CM

## 2025-07-21 PROCEDURE — 99213 OFFICE O/P EST LOW 20 MIN: CPT

## 2025-07-21 RX ORDER — CELECOXIB 200 MG/1
200 CAPSULE ORAL
Qty: 30 | Refills: 1 | Status: ACTIVE | COMMUNITY
Start: 2025-07-21 | End: 1900-01-01